# Patient Record
Sex: MALE | Race: WHITE
[De-identification: names, ages, dates, MRNs, and addresses within clinical notes are randomized per-mention and may not be internally consistent; named-entity substitution may affect disease eponyms.]

---

## 2018-10-16 ENCOUNTER — HOSPITAL ENCOUNTER (INPATIENT)
Dept: HOSPITAL 88 - FSED | Age: 63
LOS: 3 days | Discharge: HOME | DRG: 247 | End: 2018-10-19
Attending: INTERNAL MEDICINE | Admitting: INTERNAL MEDICINE
Payer: COMMERCIAL

## 2018-10-16 VITALS — WEIGHT: 200.37 LBS | HEIGHT: 68 IN | BODY MASS INDEX: 30.37 KG/M2

## 2018-10-16 VITALS — DIASTOLIC BLOOD PRESSURE: 76 MMHG | SYSTOLIC BLOOD PRESSURE: 155 MMHG

## 2018-10-16 VITALS — SYSTOLIC BLOOD PRESSURE: 141 MMHG | DIASTOLIC BLOOD PRESSURE: 84 MMHG

## 2018-10-16 VITALS — DIASTOLIC BLOOD PRESSURE: 84 MMHG | SYSTOLIC BLOOD PRESSURE: 141 MMHG

## 2018-10-16 VITALS — DIASTOLIC BLOOD PRESSURE: 62 MMHG | SYSTOLIC BLOOD PRESSURE: 118 MMHG

## 2018-10-16 VITALS — DIASTOLIC BLOOD PRESSURE: 67 MMHG | SYSTOLIC BLOOD PRESSURE: 133 MMHG

## 2018-10-16 DIAGNOSIS — E78.5: ICD-10-CM

## 2018-10-16 DIAGNOSIS — E87.1: ICD-10-CM

## 2018-10-16 DIAGNOSIS — K21.9: ICD-10-CM

## 2018-10-16 DIAGNOSIS — I25.10: ICD-10-CM

## 2018-10-16 DIAGNOSIS — R94.5: ICD-10-CM

## 2018-10-16 DIAGNOSIS — F17.210: ICD-10-CM

## 2018-10-16 DIAGNOSIS — R00.1: ICD-10-CM

## 2018-10-16 DIAGNOSIS — I21.4: Primary | ICD-10-CM

## 2018-10-16 LAB
CK MB SERPL-MCNC: 14.5 NG/ML (ref 0–5)
CK SERPL-CCNC: 268 IU/L (ref 30–200)

## 2018-10-16 PROCEDURE — 93005 ELECTROCARDIOGRAM TRACING: CPT

## 2018-10-16 PROCEDURE — 82550 ASSAY OF CK (CPK): CPT

## 2018-10-16 PROCEDURE — 85347 COAGULATION TIME ACTIVATED: CPT

## 2018-10-16 PROCEDURE — 71046 X-RAY EXAM CHEST 2 VIEWS: CPT

## 2018-10-16 PROCEDURE — 80053 COMPREHEN METABOLIC PANEL: CPT

## 2018-10-16 PROCEDURE — 76705 ECHO EXAM OF ABDOMEN: CPT

## 2018-10-16 PROCEDURE — 83880 ASSAY OF NATRIURETIC PEPTIDE: CPT

## 2018-10-16 PROCEDURE — 86039 ANTINUCLEAR ANTIBODIES (ANA): CPT

## 2018-10-16 PROCEDURE — 36415 COLL VENOUS BLD VENIPUNCTURE: CPT

## 2018-10-16 PROCEDURE — 85025 COMPLETE CBC W/AUTO DIFF WBC: CPT

## 2018-10-16 PROCEDURE — 92928 PRQ TCAT PLMT NTRAC ST 1 LES: CPT

## 2018-10-16 PROCEDURE — 82390 ASSAY OF CERULOPLASMIN: CPT

## 2018-10-16 PROCEDURE — 85610 PROTHROMBIN TIME: CPT

## 2018-10-16 PROCEDURE — 80061 LIPID PANEL: CPT

## 2018-10-16 PROCEDURE — 92920 PRQ TRLUML C ANGIOP 1ART&/BR: CPT

## 2018-10-16 PROCEDURE — 93458 L HRT ARTERY/VENTRICLE ANGIO: CPT

## 2018-10-16 PROCEDURE — 84484 ASSAY OF TROPONIN QUANT: CPT

## 2018-10-16 PROCEDURE — 83540 ASSAY OF IRON: CPT

## 2018-10-16 PROCEDURE — 86255 FLUORESCENT ANTIBODY SCREEN: CPT

## 2018-10-16 PROCEDURE — 99284 EMERGENCY DEPT VISIT MOD MDM: CPT

## 2018-10-16 PROCEDURE — 82553 CREATINE MB FRACTION: CPT

## 2018-10-16 RX ADMIN — NITROGLYCERIN PRN MG: 0.4 TABLET SUBLINGUAL at 15:06

## 2018-10-16 RX ADMIN — NITROGLYCERIN PRN MG: 0.4 TABLET SUBLINGUAL at 22:40

## 2018-10-16 RX ADMIN — NITROGLYCERIN PRN MG: 0.4 TABLET SUBLINGUAL at 16:33

## 2018-10-16 RX ADMIN — NITROGLYCERIN PRN MG: 0.4 TABLET SUBLINGUAL at 15:20

## 2018-10-16 NOTE — DIAGNOSTIC IMAGING REPORT
EXAMINATION: PA and lateral views of the chest.



COMPARISON: Chest 2 views 5/25/2017



CLINICAL HISTORY:  Chest pain for 2 hours, elevated blood pressure.

     

DISCUSSION:



Lines/tubes:  None.



Lungs:  The lungs are well inflated and clear. There is no evidence of

pneumonia or pulmonary edema.



Pleura:  There is no pleural effusion or pneumothorax.



Heart and mediastinum:  Cardiomediastinal silhouette is unremarkable.   

Pulmonary vasculature is normal.



Bones and soft tissues:  No acute bony abnormalities.  Degenerative changes in

the thoracic spine



IMPRESSION: 

No acute cardiopulmonary abnormalities.











Signed by: Dr. Feliciano Sanabria M.D. on 10/16/2018 3:52 PM

## 2018-10-17 VITALS — DIASTOLIC BLOOD PRESSURE: 91 MMHG | SYSTOLIC BLOOD PRESSURE: 145 MMHG

## 2018-10-17 VITALS — DIASTOLIC BLOOD PRESSURE: 80 MMHG | SYSTOLIC BLOOD PRESSURE: 127 MMHG

## 2018-10-17 VITALS — DIASTOLIC BLOOD PRESSURE: 95 MMHG | SYSTOLIC BLOOD PRESSURE: 135 MMHG

## 2018-10-17 VITALS — DIASTOLIC BLOOD PRESSURE: 85 MMHG | SYSTOLIC BLOOD PRESSURE: 138 MMHG

## 2018-10-17 VITALS — SYSTOLIC BLOOD PRESSURE: 125 MMHG | DIASTOLIC BLOOD PRESSURE: 87 MMHG

## 2018-10-17 VITALS — SYSTOLIC BLOOD PRESSURE: 127 MMHG | DIASTOLIC BLOOD PRESSURE: 75 MMHG

## 2018-10-17 VITALS — SYSTOLIC BLOOD PRESSURE: 146 MMHG | DIASTOLIC BLOOD PRESSURE: 95 MMHG

## 2018-10-17 VITALS — DIASTOLIC BLOOD PRESSURE: 81 MMHG | SYSTOLIC BLOOD PRESSURE: 138 MMHG

## 2018-10-17 VITALS — DIASTOLIC BLOOD PRESSURE: 66 MMHG | SYSTOLIC BLOOD PRESSURE: 96 MMHG

## 2018-10-17 VITALS — DIASTOLIC BLOOD PRESSURE: 80 MMHG | SYSTOLIC BLOOD PRESSURE: 129 MMHG

## 2018-10-17 VITALS — SYSTOLIC BLOOD PRESSURE: 93 MMHG | DIASTOLIC BLOOD PRESSURE: 62 MMHG

## 2018-10-17 VITALS — SYSTOLIC BLOOD PRESSURE: 134 MMHG | DIASTOLIC BLOOD PRESSURE: 80 MMHG

## 2018-10-17 VITALS — DIASTOLIC BLOOD PRESSURE: 58 MMHG | SYSTOLIC BLOOD PRESSURE: 99 MMHG

## 2018-10-17 VITALS — DIASTOLIC BLOOD PRESSURE: 63 MMHG | SYSTOLIC BLOOD PRESSURE: 99 MMHG

## 2018-10-17 VITALS — DIASTOLIC BLOOD PRESSURE: 86 MMHG | SYSTOLIC BLOOD PRESSURE: 134 MMHG

## 2018-10-17 VITALS — SYSTOLIC BLOOD PRESSURE: 94 MMHG | DIASTOLIC BLOOD PRESSURE: 65 MMHG

## 2018-10-17 VITALS — DIASTOLIC BLOOD PRESSURE: 62 MMHG | SYSTOLIC BLOOD PRESSURE: 93 MMHG

## 2018-10-17 VITALS — SYSTOLIC BLOOD PRESSURE: 149 MMHG | DIASTOLIC BLOOD PRESSURE: 75 MMHG

## 2018-10-17 VITALS — SYSTOLIC BLOOD PRESSURE: 143 MMHG | DIASTOLIC BLOOD PRESSURE: 85 MMHG

## 2018-10-17 VITALS — DIASTOLIC BLOOD PRESSURE: 80 MMHG | SYSTOLIC BLOOD PRESSURE: 134 MMHG

## 2018-10-17 VITALS — DIASTOLIC BLOOD PRESSURE: 99 MMHG | SYSTOLIC BLOOD PRESSURE: 142 MMHG

## 2018-10-17 VITALS — DIASTOLIC BLOOD PRESSURE: 92 MMHG | SYSTOLIC BLOOD PRESSURE: 148 MMHG

## 2018-10-17 VITALS — SYSTOLIC BLOOD PRESSURE: 149 MMHG | DIASTOLIC BLOOD PRESSURE: 93 MMHG

## 2018-10-17 VITALS — DIASTOLIC BLOOD PRESSURE: 67 MMHG | SYSTOLIC BLOOD PRESSURE: 133 MMHG

## 2018-10-17 VITALS — SYSTOLIC BLOOD PRESSURE: 144 MMHG | DIASTOLIC BLOOD PRESSURE: 84 MMHG

## 2018-10-17 LAB
ALBUMIN SERPL-MCNC: 3.6 G/DL (ref 3.5–5)
ALBUMIN/GLOB SERPL: 1.5 {RATIO} (ref 0.8–2)
ALP SERPL-CCNC: 60 IU/L (ref 40–150)
ALT SERPL-CCNC: 39 IU/L (ref 0–55)
ANION GAP SERPL CALC-SCNC: 14.7 MMOL/L (ref 8–16)
BASOPHILS # BLD AUTO: 0.1 10*3/UL (ref 0–0.1)
BASOPHILS NFR BLD AUTO: 0.4 % (ref 0–1)
BUN SERPL-MCNC: 9 MG/DL (ref 7–26)
BUN/CREAT SERPL: 13 (ref 6–25)
CALCIUM SERPL-MCNC: 8.6 MG/DL (ref 8.4–10.2)
CHLORIDE SERPL-SCNC: 99 MMOL/L (ref 98–107)
CHOLEST SERPL-MCNC: 154 MD/DL (ref 0–199)
CHOLEST/HDLC SERPL: 3.8 {RATIO} (ref 3.9–4.7)
CK MB SERPL-MCNC: 145 NG/ML (ref 0–5)
CK SERPL-CCNC: 1492 IU/L (ref 30–200)
CO2 SERPL-SCNC: 19 MMOL/L (ref 22–29)
DEPRECATED NEUTROPHILS # BLD AUTO: 9.7 10*3/UL (ref 2.1–6.9)
EGFRCR SERPLBLD CKD-EPI 2021: > 60 ML/MIN (ref 60–?)
EOSINOPHIL # BLD AUTO: 0.1 10*3/UL (ref 0–0.4)
EOSINOPHIL NFR BLD AUTO: 0.6 % (ref 0–6)
ERYTHROCYTE [DISTWIDTH] IN CORD BLOOD: 12.7 % (ref 11.7–14.4)
GLOBULIN PLAS-MCNC: 2.4 G/DL (ref 2.3–3.5)
GLUCOSE SERPLBLD-MCNC: 101 MG/DL (ref 74–118)
HCT VFR BLD AUTO: 36.8 % (ref 38.2–49.6)
HDLC SERPL-MSCNC: 41 MG/DL (ref 40–60)
HGB BLD-MCNC: 12.9 G/DL (ref 14–18)
LDLC SERPL CALC-MCNC: 101 MG/DL (ref 60–130)
LYMPHOCYTES # BLD: 3 10*3/UL (ref 1–3.2)
LYMPHOCYTES NFR BLD AUTO: 20.8 % (ref 18–39.1)
MCH RBC QN AUTO: 32.3 PG (ref 28–32)
MCHC RBC AUTO-ENTMCNC: 35.1 G/DL (ref 31–35)
MCV RBC AUTO: 92.2 FL (ref 81–99)
MONOCYTES # BLD AUTO: 1.4 10*3/UL (ref 0.2–0.8)
MONOCYTES NFR BLD AUTO: 9.6 % (ref 4.4–11.3)
NEUTS SEG NFR BLD AUTO: 68 % (ref 38.7–80)
PLATELET # BLD AUTO: 248 X10E3/UL (ref 140–360)
POTASSIUM SERPL-SCNC: 3.7 MMOL/L (ref 3.5–5.1)
RBC # BLD AUTO: 3.99 X10E6/UL (ref 4.3–5.7)
SODIUM SERPL-SCNC: 129 MMOL/L (ref 136–145)
TRIGL SERPL-MCNC: 59 MG/DL (ref 0–149)

## 2018-10-17 PROCEDURE — B2151ZZ FLUOROSCOPY OF LEFT HEART USING LOW OSMOLAR CONTRAST: ICD-10-PCS | Performed by: INTERNAL MEDICINE

## 2018-10-17 PROCEDURE — 4A023N7 MEASUREMENT OF CARDIAC SAMPLING AND PRESSURE, LEFT HEART, PERCUTANEOUS APPROACH: ICD-10-PCS | Performed by: INTERNAL MEDICINE

## 2018-10-17 PROCEDURE — 02703ZZ DILATION OF CORONARY ARTERY, ONE ARTERY, PERCUTANEOUS APPROACH: ICD-10-PCS | Performed by: INTERNAL MEDICINE

## 2018-10-17 PROCEDURE — 027034Z DILATION OF CORONARY ARTERY, ONE ARTERY WITH DRUG-ELUTING INTRALUMINAL DEVICE, PERCUTANEOUS APPROACH: ICD-10-PCS | Performed by: INTERNAL MEDICINE

## 2018-10-17 PROCEDURE — B2111ZZ FLUOROSCOPY OF MULTIPLE CORONARY ARTERIES USING LOW OSMOLAR CONTRAST: ICD-10-PCS | Performed by: INTERNAL MEDICINE

## 2018-10-17 RX ADMIN — SERTRALINE HYDROCHLORIDE SCH MG: 50 TABLET, FILM COATED ORAL at 09:42

## 2018-10-17 RX ADMIN — PANTOPRAZOLE SODIUM SCH MLS/HR: 40 INJECTION, POWDER, FOR SOLUTION INTRAVENOUS at 03:00

## 2018-10-17 RX ADMIN — Medication SCH MG: at 09:41

## 2018-10-17 RX ADMIN — SODIUM CHLORIDE SCH MLS/HR: 9 INJECTION, SOLUTION INTRAVENOUS at 03:15

## 2018-10-17 RX ADMIN — QUETIAPINE SCH MG: 25 TABLET, FILM COATED ORAL at 09:42

## 2018-10-17 RX ADMIN — Medication SCH MG: at 09:40

## 2018-10-17 RX ADMIN — SODIUM CHLORIDE SCH MLS/HR: 9 INJECTION, SOLUTION INTRAVENOUS at 03:16

## 2018-10-17 RX ADMIN — PANTOPRAZOLE SODIUM SCH MLS/HR: 40 INJECTION, POWDER, FOR SOLUTION INTRAVENOUS at 13:37

## 2018-10-17 RX ADMIN — SODIUM CHLORIDE SCH MLS/HR: 9 INJECTION, SOLUTION INTRAVENOUS at 13:37

## 2018-10-17 RX ADMIN — CLOPIDOGREL BISULFATE SCH MG: 75 TABLET, FILM COATED ORAL at 09:42

## 2018-10-17 RX ADMIN — SODIUM CHLORIDE SCH MLS/HR: 9 INJECTION, SOLUTION INTRAVENOUS at 09:42

## 2018-10-17 RX ADMIN — PANTOPRAZOLE SODIUM SCH MLS/HR: 40 INJECTION, POWDER, FOR SOLUTION INTRAVENOUS at 09:40

## 2018-10-17 RX ADMIN — METOPROLOL TARTRATE SCH MG: 25 TABLET, FILM COATED ORAL at 17:00

## 2018-10-17 RX ADMIN — METOPROLOL TARTRATE SCH MG: 25 TABLET, FILM COATED ORAL at 09:41

## 2018-10-17 RX ADMIN — PANTOPRAZOLE SODIUM SCH MLS/HR: 40 INJECTION, POWDER, FOR SOLUTION INTRAVENOUS at 20:44

## 2018-10-17 RX ADMIN — Medication SCH MG: at 17:05

## 2018-10-17 RX ADMIN — PANTOPRAZOLE SODIUM SCH MLS/HR: 40 INJECTION, POWDER, FOR SOLUTION INTRAVENOUS at 18:00

## 2018-10-17 NOTE — OPERATIVE REPORT
DATE OF PROCEDURE:  October 17, 2018 

 

TITLE OF PROCEDURES 

1.  Left cardiac catheterization.

2.  Percutaneous coronary intervention and stenting of the circumflex 

coronary artery.  



INDICATIONS:  Non-ST-elevation myocardial infarction.



TECHNICAL DETAILS:  After the usual sterile preparation and draping 

procedure, intravenous Versed and fentanyl given for sedation, local 

Xylocaine for anesthesia.  A 4-[place] sheath established in place.  Nicola 

left 4 and 3DRC catheter to engage the coronary.  Pigtail for hemodynamic 

measurement and left ventriculogram.



A decision was made to proceed with PCI.  The 4-[place] sheath was exchanged 

to a 6-[place] sheath.  Angio-Max given in the usual dosage.  Guiding 

catheter was XB 3.5.  Balloon was 2.5 x 15.  Stent was 2.5 x 15.  Resolute 

Integrity stent up to 15 atmospheres.  At the end of the procedure, sheath 

was removed.  Hemostasis achieved with deployment of Angio-Seal closure 

device.  There were no complications and no blood loss.  



RESULTS 

A.  Coronary angiogram:  Old arteries are heavily calcified.  Left main 

distal disease at 40%.

1.  LAD, several plaques at 50% to 60% of the diagonal LAD midproximal.

2.  Ramus, mild disease.

3.  Circumflex heavily calcified artery, 99% lesion.

4.  Right coronary artery 50% to 60% proximal lesion several plaques 40% to 

50% across its course.  

B.  Hemodynamics:  Aortic pressure 130/70.  LV pressure 130/21.  

C.  Left ventriculogram:  Right anterior oblique view showed normal size 

ventricle.  Ejection fraction of 50%.





PCI PROCEDURE:  A guiding catheter 6-[place] XB 3.5 ballooning 2.5 x 15 and 

stenting 2.5 x 15 up to 15 atmospheres.  Lesion prior to intervention at 

99% heavily calcified.  Following intervention at zero percent.  



COMPLICATIONS:  None.



BLOOD LOSS:  None.  



IMPRESSION

1.  Three-vessel coronary artery disease.  

2.  Forty percent left main disease.

3.  Successful percutaneous coronary intervention and stenting of subtotal 

coronary lesion.  













DD:  10/17/2018 05:29

DT:  10/17/2018 06:34

Job#:  [id number]

## 2018-10-17 NOTE — HISTORY AND PHYSICAL
HISTORY OF PRESENT ILLNESS:  The patient is a 62-year-old male who has a 

past medical history positive only for gastroesophageal reflux disease.  

Came originally to the emergency room complaining of epigastric pain.  

Patient was seen by Dr. Nicolas, the cardiologist.  Cardiac enzymes were 

elevated.  He did a cardiac cath.  Cardiac catheterization showed evidence 

of left main distal disease of 40%, LAD of 50% to 60%, ramus mild disease.  

Circumflex is a heavily calcified artery with 99% lesion.  Right coronary 

artery had 50% to 60% lesions and several plaques 40% to 50% across its 

course.  Ejection fraction 50%.  Patient had a stent placement.  Patient is 

feeling better right now.  



REVIEW OF SYSTEMS

CARDIOVASCULAR:  No chest pain or palpitations. 

RESPIRATORY:  No shortness of breath, no cough. 

GASTROINTESTINAL:  No nausea, no vomiting, no diarrhea. 

GENITOURINARY:  No frequency.  No dysuria.



ALLERGIES:  HE CLAIMS THAT HE IS NOT ALLERGIC TO ANY MEDICATION.



SOCIAL HISTORY:  He smoked until recently.  No history of alcohol abuse.  



PAST MEDICAL HISTORY:  Only positive for gastroesophageal reflux disease. 



PHYSICAL EXAMINATION

VITAL SIGNS:  Blood pressure 99/63, temperature 98 degrees, heart rate 67 

per minute, respiratory rate 16 per minute.  Oxygen saturation 98%. 

HEART:   Regular rhythm.  Normal S1 and S2 sounds. 

LUNGS:  Clear bilaterally. 

ABDOMEN:  Soft. 

EXTREMITIES:  No evidence of cyanosis, edema or trauma.  



LABS:  On the BMP, sodium 129, potassium 3.7, chloride 99, CO2 19, BUN 9, 

creatinine 0.72.  Glucose 101.  On the CBC, white blood count is elevated 

at 14,200, hemoglobin 12.9, hematocrit 36.8, platelet count 248,000.  AST 

is elevated at 138, ALT 39, total bilirubin 0.5, alkaline phosphatase 60.  



The chest x-ray came back negative. 



FINAL IMPRESSION

1. Coronary artery disease, status post non-ST-elevation myocardial 

infarction. 

2. Hyponatremia.  

3. Gastroesophageal reflux disease. 

4. Elevated liver function tests.  



PLAN OF TREATMENT:  He is on a Protonix drip because of epigastric pain.  

We are going to discontinue the fluids.  We are going to continue with 

aspirin 325 mg daily, metoprolol 25 mg twice a day, BuSpar 7.5 mg twice a 

day, Plavix 75 mg daily.  I am going to discontinue the Lipitor because of 

extremely elevated AST.  Continue Seroquel 25 mg daily, Norco 1 tablet q.6. 

 h. as needed for pain, Zofran 4 mg IV q.4 h. as needed, sertraline 50 mg 

daily, Ambien 5 mg at night p.r.n. for sleep.  The case has been discussed 

with the patient and the family at bedside.  



Time spent 45 minutes.







DD:  10/17/2018 16:39

DT:  10/17/2018 16:57

Job#:  [id number]

## 2018-10-17 NOTE — CONSULTATION
DATE OF CONSULTATION:    



HISTORY:  This is a 62-year-old gentleman, smoker, hypercholesterolemic, 

and GERD symptoms.  Patient in his usual status of health.  Today, he had 

prolonged chest pain.  He went to the emergency room [facility].  His 1st 

set of cardiac enzyme is normal.  Cardiac consultation obtained because his 

cardiac enzymes were elevated.  Patient continued to have chest pain.  

Patient seen immediately, evaluated.  Because of his chest pain and the 

positive cardiac enzymes, although his EKG showed no changes, we elected to 

proceed with cardiac catheterization with possible intervention.  



Patient, prior to that, he denied having any angina.  He does have his 

usual GERD-like symptoms.  He is a heavy smoker.  There is no _______ 

paroxysmal nocturnal dyspnea.  There is no syncope or presyncope.



REVIEW OF SYSTEMS

CARDIAC:  As per above. 

PULMONARY:  As per above. 

GI:  Severe GERD.  No hematemesis.  No melena. 

:  No hematuria.  No dysuria.  

MUSCULOSKELETAL:  Aches and pain.  

NEUROLOGICAL:  No localized deficits. 



SOCIAL HISTORY:  He is .  He is a heavy smoker.  He is no alcohol 

drinker.  His wife _______.  



ALLERGIES:  CODEINE.



HOME MEDICATIONS:  Zoloft, Seroquel, buspirone, Pepcid. 





PAST MEDICAL HISTORY

1. Tonsillectomy. 

2. GERD. 

3. Smoker.  

4. Hypercholesterolemia.



FAMILY HISTORY:  No family history of premature coronary artery disease.



PHYSICAL EXAMINATION

VITALS:  Height of 5'8.  Weight of 200 pounds.  Blood pressure 140/70.  

Heart rate of 70.  Respiratory rate of 18. 

HEENT:  Pupils are reactive. 

NECK:  No elevation of jugular venous pulsation.  No bruit. 

CHEST:  Clear to auscultation and percussion. 

HEART:  PMI 1st intercostal space.  Normal 1st and 2nd heart sounds. 

ABDOMEN:  Soft with good bowel sounds. 

EXTREMITIES:  No cyanosis.  No clubbing.  No edema. 



LAB DATA:  Sodium 147, potassium 4.1.  BUN is 12 creatinine 0.8.  Glucose 

118.  White blood cell count of 10.8, hemoglobin of 15.4, hematocrit 46%, 

platelet count of 304,000.  CK is elevated and CK-MB, as well as troponin 

at 31.7.  



IMPRESSIONS AND PLAN

1. Acute non-ST-elevation myocardial infarction.  

2. Smoker.

3. Hypercholesterolemic.  





Patient loaded with Plavix.  Options of workup are discussed.  Patient will 

be taken to the cardiac cath lab for cardiac catheterization with possible 

intervention.  Procedure, risks, benefits, alternatives are discussed and 

explained.









DD:  10/17/2018 05:25

DT:  10/17/2018 05:45

Job#:  [id number]

## 2018-10-18 VITALS — SYSTOLIC BLOOD PRESSURE: 97 MMHG | DIASTOLIC BLOOD PRESSURE: 61 MMHG

## 2018-10-18 VITALS — DIASTOLIC BLOOD PRESSURE: 59 MMHG | SYSTOLIC BLOOD PRESSURE: 94 MMHG

## 2018-10-18 VITALS — SYSTOLIC BLOOD PRESSURE: 106 MMHG | DIASTOLIC BLOOD PRESSURE: 52 MMHG

## 2018-10-18 VITALS — DIASTOLIC BLOOD PRESSURE: 53 MMHG | SYSTOLIC BLOOD PRESSURE: 92 MMHG

## 2018-10-18 VITALS — DIASTOLIC BLOOD PRESSURE: 59 MMHG | SYSTOLIC BLOOD PRESSURE: 96 MMHG

## 2018-10-18 VITALS — DIASTOLIC BLOOD PRESSURE: 67 MMHG | SYSTOLIC BLOOD PRESSURE: 106 MMHG

## 2018-10-18 LAB
ALBUMIN SERPL-MCNC: 3.5 G/DL (ref 3.5–5)
ALBUMIN/GLOB SERPL: 1.3 {RATIO} (ref 0.8–2)
ALP SERPL-CCNC: 60 IU/L (ref 40–150)
ALT SERPL-CCNC: 46 IU/L (ref 0–55)
ANION GAP SERPL CALC-SCNC: 15.2 MMOL/L (ref 8–16)
BASOPHILS # BLD AUTO: 0.1 10*3/UL (ref 0–0.1)
BASOPHILS NFR BLD AUTO: 0.5 % (ref 0–1)
BUN SERPL-MCNC: 9 MG/DL (ref 7–26)
BUN/CREAT SERPL: 13 (ref 6–25)
CALCIUM SERPL-MCNC: 8.9 MG/DL (ref 8.4–10.2)
CHLORIDE SERPL-SCNC: 101 MMOL/L (ref 98–107)
CO2 SERPL-SCNC: 22 MMOL/L (ref 22–29)
DEPRECATED NEUTROPHILS # BLD AUTO: 5.6 10*3/UL (ref 2.1–6.9)
EGFRCR SERPLBLD CKD-EPI 2021: > 60 ML/MIN (ref 60–?)
EOSINOPHIL # BLD AUTO: 0.2 10*3/UL (ref 0–0.4)
EOSINOPHIL NFR BLD AUTO: 1.7 % (ref 0–6)
ERYTHROCYTE [DISTWIDTH] IN CORD BLOOD: 13 % (ref 11.7–14.4)
GLOBULIN PLAS-MCNC: 2.6 G/DL (ref 2.3–3.5)
GLUCOSE SERPLBLD-MCNC: 91 MG/DL (ref 74–118)
HCT VFR BLD AUTO: 37.7 % (ref 38.2–49.6)
HGB BLD-MCNC: 13 G/DL (ref 14–18)
LYMPHOCYTES # BLD: 2.8 10*3/UL (ref 1–3.2)
LYMPHOCYTES NFR BLD AUTO: 28.9 % (ref 18–39.1)
MCH RBC QN AUTO: 31.9 PG (ref 28–32)
MCHC RBC AUTO-ENTMCNC: 34.5 G/DL (ref 31–35)
MCV RBC AUTO: 92.4 FL (ref 81–99)
MONOCYTES # BLD AUTO: 1.2 10*3/UL (ref 0.2–0.8)
MONOCYTES NFR BLD AUTO: 12.1 % (ref 4.4–11.3)
NEUTS SEG NFR BLD AUTO: 56.5 % (ref 38.7–80)
PLATELET # BLD AUTO: 254 X10E3/UL (ref 140–360)
POTASSIUM SERPL-SCNC: 4.2 MMOL/L (ref 3.5–5.1)
RBC # BLD AUTO: 4.08 X10E6/UL (ref 4.3–5.7)
SODIUM SERPL-SCNC: 134 MMOL/L (ref 136–145)

## 2018-10-18 RX ADMIN — QUETIAPINE SCH MG: 25 TABLET, FILM COATED ORAL at 08:23

## 2018-10-18 RX ADMIN — PANTOPRAZOLE SODIUM SCH MG: 40 TABLET, DELAYED RELEASE ORAL at 08:23

## 2018-10-18 RX ADMIN — Medication SCH MG: at 08:23

## 2018-10-18 RX ADMIN — METOPROLOL TARTRATE SCH MG: 25 TABLET, FILM COATED ORAL at 15:55

## 2018-10-18 RX ADMIN — Medication SCH MG: at 16:09

## 2018-10-18 RX ADMIN — SERTRALINE HYDROCHLORIDE SCH MG: 50 TABLET, FILM COATED ORAL at 08:23

## 2018-10-18 RX ADMIN — METOPROLOL TARTRATE SCH MG: 25 TABLET, FILM COATED ORAL at 08:23

## 2018-10-18 RX ADMIN — CLOPIDOGREL BISULFATE SCH MG: 75 TABLET, FILM COATED ORAL at 08:23

## 2018-10-18 NOTE — DIAGNOSTIC IMAGING REPORT
******** ADDENDUM #1 ********



Indication: Elevated LFTs



Signed by: Dr. Randy Hi MD on 10/29/2018 1:09 PM

******** ORIGINAL REPORT ********



EXAM: RUQ ULTRASOUND



Date: 10/18/2018 12:00 AM



Indication:     



Comparison: None



Technique: Sonographic evaluation of the right upper quadrant.  Color doppler

was utilized to supplement evaluation.



FINDINGS:



LIVER:  

No focal lesion is identified.  The liver measures 15.0 cm in the right

midclavicular line.  Echotexture is normal.



BILIARY:  

The gallbladder has a normal appearance, without evidence for gallstones,

gallbladder wall thickening or pericholecystic fluid.  The common bile duct

measures 0.3 cm.    





PANCREAS: 

Obscured by bowel gas.     



RIGHT KIDNEY:  Measures 11.2 cm in length.  No hydronephrosis or solid mass

lesion identified.    



PERITONEUM:  

No free fluid.

     

VASCULATURE:  

     Aorta:  Obscured by bowel gas.

     Interior vena cava:  Obscured by bowel gas.

     Portal Vein:  Nondilated with hepatopedal flow.





IMPRESSION: 

Unremarkable RUQ ultrasound.









 



Signed by: Dr. Randy Hi MD on 10/18/2018 3:26 PM

## 2018-10-18 NOTE — DISCHARGE SUMMARY
HISTORY OF PRESENT ILLNESS:  A 62-year-old male with no past medical 

history.  He came here with chest pain.  He was found to have  evidence of 

multiple coronary artery disease.  He had a stent placed in the right 

coronary artery.  He has 40% stenosis in the left main and 50% to 60% 

stenosis in the left main, 50% to 60% stenosis in the LAD, 99% stenosis in 

the circumflex, 50% to 60% stenosis in the right coronary artery and a 

stent was placed there.  Patient has been started on metoprolol, Lipitor, 

aspirin and Plavix.  Lipitor going to be discontinued because of elevated 

LFTs.  We are doing a workup for the elevated LFTs which can be completed 

as an outpatient.



PHYSICAL EXAM:  

VITAL SIGNS:  Blood pressure 96/59, temperature 96.6, heart rate 52 per 

minute, respiratory rate 20 per minute, oxygen saturation 99%.  

HEART:  Regular rhythm. No murmur. No extra sounds. 

LUNGS:  Clear bilaterally. 

ABDOMEN:  Soft.

EXTREMITIES: Show no evidence of cyanosis, edema or trauma.

 

On the BMP sodium 134, potassium 4.2, chloride 101, CO2 22, BUN 9, 

creatinine 0.69, glucose 91.  On the CBC white blood count 9.83, hemoglobin 

13.0, hematocrit 37.7, platelet count of 254,000.  AST 79, ALT 46.  Total 

bilirubin was 0.6, alkaline phos was 60. 



FINAL IMPRESSION:   

1. Coronary artery disease status post non ST segment elevation 

myocardial fraction. 

2. Gastroesophageal reflux disease.  

3. Elevated liver function test which is slowly resolving.  



PLAN OF TREATMENT:  Continue Zoloft 50 mg daily.  Ambien 5 mg at night 

p.r.n. for sleep.  Continue aspirin 325 mg daily.  Metoprolol, we are going 

to decrease to 25 mg once a day because of borderline low blood pressure 

and borderline bradycardia.  Continue Plavix 75 mg daily and Protonix 40 mg 

daily.  Patient is going to be staying in the hospital until tomorrow per 

Dr. Nicolas's instructions due to an episode of chest pain that he had 

today.  He is going to have a liver ultrasound to investigate the elevated 

LFTs.  Going to also do a hepatitis profile, antinuclear antibodies, 

ceruloplasmin levels, antimitochondrial antibodies, iron levels as part of 

the workup for elevated LFTs.  Patient denies any alcohol drinking.  One of 

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  The 

workup can be done as an outpatient for that.  Tentative discharge 

tomorrow.  



 _________________________________

JAYSON GIBBS MD



DD:  10/18/2018 12:22

DT:  10/18/2018 14:51

Job#:  [id number]

## 2018-10-19 VITALS — DIASTOLIC BLOOD PRESSURE: 77 MMHG | SYSTOLIC BLOOD PRESSURE: 115 MMHG

## 2018-10-19 VITALS — SYSTOLIC BLOOD PRESSURE: 112 MMHG | DIASTOLIC BLOOD PRESSURE: 52 MMHG

## 2018-10-19 VITALS — SYSTOLIC BLOOD PRESSURE: 103 MMHG | DIASTOLIC BLOOD PRESSURE: 62 MMHG

## 2018-10-19 LAB
ALBUMIN SERPL-MCNC: 3.3 G/DL (ref 3.5–5)
ALBUMIN/GLOB SERPL: 1.2 {RATIO} (ref 0.8–2)
ALP SERPL-CCNC: 65 IU/L (ref 40–150)
ALT SERPL-CCNC: 33 IU/L (ref 0–55)
ANION GAP SERPL CALC-SCNC: 13.3 MMOL/L (ref 8–16)
BUN SERPL-MCNC: 12 MG/DL (ref 7–26)
BUN/CREAT SERPL: 16 (ref 6–25)
CALCIUM SERPL-MCNC: 9.3 MG/DL (ref 8.4–10.2)
CHLORIDE SERPL-SCNC: 103 MMOL/L (ref 98–107)
CO2 SERPL-SCNC: 24 MMOL/L (ref 22–29)
EGFRCR SERPLBLD CKD-EPI 2021: > 60 ML/MIN (ref 60–?)
GLOBULIN PLAS-MCNC: 2.7 G/DL (ref 2.3–3.5)
GLUCOSE SERPLBLD-MCNC: 93 MG/DL (ref 74–118)
POTASSIUM SERPL-SCNC: 4.3 MMOL/L (ref 3.5–5.1)
SODIUM SERPL-SCNC: 136 MMOL/L (ref 136–145)

## 2018-10-19 RX ADMIN — Medication SCH MG: at 09:47

## 2018-10-19 RX ADMIN — SERTRALINE HYDROCHLORIDE SCH MG: 50 TABLET, FILM COATED ORAL at 09:48

## 2018-10-19 RX ADMIN — CLOPIDOGREL BISULFATE SCH MG: 75 TABLET, FILM COATED ORAL at 09:48

## 2018-10-19 RX ADMIN — METOPROLOL TARTRATE SCH MG: 25 TABLET, FILM COATED ORAL at 09:48

## 2018-10-19 RX ADMIN — QUETIAPINE SCH MG: 25 TABLET, FILM COATED ORAL at 09:48

## 2018-10-19 RX ADMIN — PANTOPRAZOLE SODIUM SCH MG: 40 TABLET, DELAYED RELEASE ORAL at 09:30
